# Patient Record
Sex: FEMALE | Race: WHITE | NOT HISPANIC OR LATINO
[De-identification: names, ages, dates, MRNs, and addresses within clinical notes are randomized per-mention and may not be internally consistent; named-entity substitution may affect disease eponyms.]

---

## 2017-05-09 PROBLEM — Z00.00 ENCOUNTER FOR PREVENTIVE HEALTH EXAMINATION: Status: ACTIVE | Noted: 2017-05-09

## 2017-05-15 ENCOUNTER — APPOINTMENT (OUTPATIENT)
Dept: BREAST CENTER | Facility: CLINIC | Age: 52
End: 2017-05-15

## 2017-05-15 VITALS
HEIGHT: 63 IN | WEIGHT: 129 LBS | TEMPERATURE: 97.3 F | DIASTOLIC BLOOD PRESSURE: 58 MMHG | SYSTOLIC BLOOD PRESSURE: 93 MMHG | HEART RATE: 69 BPM | BODY MASS INDEX: 22.86 KG/M2

## 2017-05-24 ENCOUNTER — RESULT REVIEW (OUTPATIENT)
Age: 52
End: 2017-05-24

## 2017-05-25 ENCOUNTER — OUTPATIENT (OUTPATIENT)
Dept: OUTPATIENT SERVICES | Facility: HOSPITAL | Age: 52
LOS: 1 days | End: 2017-05-25
Payer: MEDICAID

## 2017-05-25 DIAGNOSIS — R89.7 ABNORMAL HISTOLOGICAL FINDINGS IN SPECIMENS FROM OTHER ORGANS, SYSTEMS AND TISSUES: ICD-10-CM

## 2017-05-25 PROCEDURE — 88321 CONSLTJ&REPRT SLD PREP ELSWR: CPT

## 2017-05-26 LAB — SURGICAL PATHOLOGY STUDY: SIGNIFICANT CHANGE UP

## 2017-05-31 ENCOUNTER — FORM ENCOUNTER (OUTPATIENT)
Age: 52
End: 2017-05-31

## 2017-06-01 ENCOUNTER — OUTPATIENT (OUTPATIENT)
Dept: OUTPATIENT SERVICES | Facility: HOSPITAL | Age: 52
LOS: 1 days | End: 2017-06-01
Payer: COMMERCIAL

## 2017-06-01 PROCEDURE — 76641 ULTRASOUND BREAST COMPLETE: CPT | Mod: 26,50

## 2017-06-01 PROCEDURE — 76641 ULTRASOUND BREAST COMPLETE: CPT

## 2017-06-05 ENCOUNTER — OUTPATIENT (OUTPATIENT)
Dept: OUTPATIENT SERVICES | Facility: HOSPITAL | Age: 52
LOS: 1 days | End: 2017-06-05
Payer: COMMERCIAL

## 2017-06-05 ENCOUNTER — APPOINTMENT (OUTPATIENT)
Dept: SURGERY | Facility: CLINIC | Age: 52
End: 2017-06-05

## 2017-06-05 VITALS
TEMPERATURE: 99 F | RESPIRATION RATE: 16 BRPM | SYSTOLIC BLOOD PRESSURE: 90 MMHG | HEART RATE: 62 BPM | HEIGHT: 63 IN | WEIGHT: 136.69 LBS | DIASTOLIC BLOOD PRESSURE: 60 MMHG

## 2017-06-05 DIAGNOSIS — Z98.890 OTHER SPECIFIED POSTPROCEDURAL STATES: Chronic | ICD-10-CM

## 2017-06-05 DIAGNOSIS — N63 UNSPECIFIED LUMP IN BREAST: ICD-10-CM

## 2017-06-05 DIAGNOSIS — Z01.818 ENCOUNTER FOR OTHER PREPROCEDURAL EXAMINATION: ICD-10-CM

## 2017-06-05 DIAGNOSIS — J30.2 OTHER SEASONAL ALLERGIC RHINITIS: ICD-10-CM

## 2017-06-05 DIAGNOSIS — F41.9 ANXIETY DISORDER, UNSPECIFIED: ICD-10-CM

## 2017-06-05 DIAGNOSIS — F32.89 OTHER SPECIFIED DEPRESSIVE EPISODES: ICD-10-CM

## 2017-06-05 DIAGNOSIS — F32.9 MAJOR DEPRESSIVE DISORDER, SINGLE EPISODE, UNSPECIFIED: ICD-10-CM

## 2017-06-05 DIAGNOSIS — F41.1 GENERALIZED ANXIETY DISORDER: ICD-10-CM

## 2017-06-05 LAB
ANION GAP SERPL CALC-SCNC: 9 MMOL/L — SIGNIFICANT CHANGE UP (ref 5–17)
BUN SERPL-MCNC: 13 MG/DL — SIGNIFICANT CHANGE UP (ref 7–23)
CALCIUM SERPL-MCNC: 9.1 MG/DL — SIGNIFICANT CHANGE UP (ref 8.4–10.5)
CHLORIDE SERPL-SCNC: 104 MMOL/L — SIGNIFICANT CHANGE UP (ref 96–108)
CO2 SERPL-SCNC: 24 MMOL/L — SIGNIFICANT CHANGE UP (ref 22–31)
CREAT SERPL-MCNC: 0.7 MG/DL — SIGNIFICANT CHANGE UP (ref 0.5–1.3)
GLUCOSE SERPL-MCNC: 72 MG/DL — SIGNIFICANT CHANGE UP (ref 70–99)
HCT VFR BLD CALC: 35.1 % — SIGNIFICANT CHANGE UP (ref 34.5–45)
HGB BLD-MCNC: 11.5 G/DL — SIGNIFICANT CHANGE UP (ref 11.5–15.5)
MCHC RBC-ENTMCNC: 30.2 PG — SIGNIFICANT CHANGE UP (ref 27–34)
MCHC RBC-ENTMCNC: 32.8 G/DL — SIGNIFICANT CHANGE UP (ref 32–36)
MCV RBC AUTO: 92.1 FL — SIGNIFICANT CHANGE UP (ref 80–100)
PLATELET # BLD AUTO: 240 K/UL — SIGNIFICANT CHANGE UP (ref 150–400)
POTASSIUM SERPL-MCNC: 4.4 MMOL/L — SIGNIFICANT CHANGE UP (ref 3.5–5.3)
POTASSIUM SERPL-SCNC: 4.4 MMOL/L — SIGNIFICANT CHANGE UP (ref 3.5–5.3)
RBC # BLD: 3.81 M/UL — SIGNIFICANT CHANGE UP (ref 3.8–5.2)
RBC # FLD: 12.8 % — SIGNIFICANT CHANGE UP (ref 10.3–16.9)
SODIUM SERPL-SCNC: 137 MMOL/L — SIGNIFICANT CHANGE UP (ref 135–145)
WBC # BLD: 5.7 K/UL — SIGNIFICANT CHANGE UP (ref 3.8–10.5)
WBC # FLD AUTO: 5.7 K/UL — SIGNIFICANT CHANGE UP (ref 3.8–10.5)

## 2017-06-05 PROCEDURE — 93010 ELECTROCARDIOGRAM REPORT: CPT

## 2017-06-05 RX ORDER — ESCITALOPRAM OXALATE 10 MG/1
1 TABLET, FILM COATED ORAL
Qty: 0 | Refills: 0 | COMMUNITY

## 2017-06-05 RX ORDER — CLONAZEPAM 1 MG
0 TABLET ORAL
Qty: 0 | Refills: 0 | COMMUNITY

## 2017-06-05 NOTE — H&P PST ADULT - HISTORY OF PRESENT ILLNESS
52 year old female with right breast papilloma (N63) 52 year old white female with right breast papilloma (N63) had mammogram sonogram April 2017 and had biopsy.  This past week repeat sonogram was dense and scheduled for contrast ultrasound.  Mother had breast cancer post menopausal on HRT.  First pregnancy age 42.

## 2017-06-05 NOTE — H&P PST ADULT - NSANTHOSAYNRD_GEN_A_CORE
No. MONIQUE screening performed.  STOP BANG Legend: 0-2 = LOW Risk; 3-4 = INTERMEDIATE Risk; 5-8 = HIGH Risk

## 2017-06-05 NOTE — ASU PATIENT PROFILE, ADULT - VISION (WITH CORRECTIVE LENSES IF THE PATIENT USUALLY WEARS THEM):
Partially impaired: cannot see medication labels or newsprint, but can see obstacles in path, and the surrounding layout; can count fingers at arm's length/eyeglass for reading

## 2017-06-14 ENCOUNTER — FORM ENCOUNTER (OUTPATIENT)
Age: 52
End: 2017-06-14

## 2017-06-15 ENCOUNTER — OUTPATIENT (OUTPATIENT)
Dept: OUTPATIENT SERVICES | Facility: HOSPITAL | Age: 52
LOS: 1 days | End: 2017-06-15
Payer: COMMERCIAL

## 2017-06-15 DIAGNOSIS — Z98.890 OTHER SPECIFIED POSTPROCEDURAL STATES: Chronic | ICD-10-CM

## 2017-06-15 PROCEDURE — G0204: CPT | Mod: 26

## 2017-06-15 PROCEDURE — 77066 DX MAMMO INCL CAD BI: CPT

## 2017-06-19 ENCOUNTER — MESSAGE (OUTPATIENT)
Age: 52
End: 2017-06-19

## 2017-06-19 ENCOUNTER — FORM ENCOUNTER (OUTPATIENT)
Age: 52
End: 2017-06-19

## 2017-06-20 ENCOUNTER — OUTPATIENT (OUTPATIENT)
Dept: OUTPATIENT SERVICES | Facility: HOSPITAL | Age: 52
LOS: 1 days | End: 2017-06-20
Payer: COMMERCIAL

## 2017-06-20 DIAGNOSIS — Z98.890 OTHER SPECIFIED POSTPROCEDURAL STATES: Chronic | ICD-10-CM

## 2017-06-20 PROCEDURE — 77059 MRI BREAST BILATERAL: CPT | Mod: 26

## 2017-06-20 PROCEDURE — C8937: CPT

## 2017-06-20 PROCEDURE — 77049 MRI BREAST C-+ W/CAD BI: CPT

## 2017-06-20 PROCEDURE — A9585: CPT

## 2017-06-20 PROCEDURE — 0159T: CPT | Mod: 26

## 2017-06-27 ENCOUNTER — FORM ENCOUNTER (OUTPATIENT)
Age: 52
End: 2017-06-27

## 2017-06-28 ENCOUNTER — APPOINTMENT (OUTPATIENT)
Dept: BREAST CENTER | Facility: CLINIC | Age: 52
End: 2017-06-28

## 2017-06-28 ENCOUNTER — RESULT REVIEW (OUTPATIENT)
Age: 52
End: 2017-06-28

## 2017-06-28 ENCOUNTER — OUTPATIENT (OUTPATIENT)
Dept: OUTPATIENT SERVICES | Facility: HOSPITAL | Age: 52
LOS: 1 days | End: 2017-06-28
Payer: COMMERCIAL

## 2017-06-28 DIAGNOSIS — Z98.890 OTHER SPECIFIED POSTPROCEDURAL STATES: Chronic | ICD-10-CM

## 2017-06-28 PROCEDURE — A4648: CPT

## 2017-06-28 PROCEDURE — 88305 TISSUE EXAM BY PATHOLOGIST: CPT

## 2017-06-28 PROCEDURE — 77065 DX MAMMO INCL CAD UNI: CPT

## 2017-06-28 PROCEDURE — 19085 BX BREAST 1ST LESION MR IMAG: CPT | Mod: RT

## 2017-06-28 PROCEDURE — G0206: CPT | Mod: 26,RT

## 2017-06-28 PROCEDURE — 19086 BX BREAST ADD LESION MR IMAG: CPT

## 2017-06-28 PROCEDURE — 19086 BX BREAST ADD LESION MR IMAG: CPT | Mod: RT

## 2017-06-28 PROCEDURE — 19085 BX BREAST 1ST LESION MR IMAG: CPT

## 2017-06-28 PROCEDURE — A9585: CPT

## 2017-06-28 PROCEDURE — 88342 IMHCHEM/IMCYTCHM 1ST ANTB: CPT

## 2017-06-28 PROCEDURE — 88341 IMHCHEM/IMCYTCHM EA ADD ANTB: CPT

## 2017-06-30 LAB — SURGICAL PATHOLOGY STUDY: SIGNIFICANT CHANGE UP

## 2017-07-28 ENCOUNTER — OUTPATIENT (OUTPATIENT)
Dept: OUTPATIENT SERVICES | Facility: HOSPITAL | Age: 52
LOS: 1 days | End: 2017-07-28

## 2017-07-28 ENCOUNTER — APPOINTMENT (OUTPATIENT)
Dept: SURGERY | Facility: CLINIC | Age: 52
End: 2017-07-28

## 2017-07-28 VITALS
HEIGHT: 63 IN | TEMPERATURE: 97 F | DIASTOLIC BLOOD PRESSURE: 55 MMHG | RESPIRATION RATE: 16 BRPM | SYSTOLIC BLOOD PRESSURE: 90 MMHG | HEART RATE: 57 BPM | WEIGHT: 132.28 LBS | OXYGEN SATURATION: 98 %

## 2017-07-28 DIAGNOSIS — N63 UNSPECIFIED LUMP IN BREAST: ICD-10-CM

## 2017-07-28 DIAGNOSIS — Z98.890 OTHER SPECIFIED POSTPROCEDURAL STATES: Chronic | ICD-10-CM

## 2017-07-28 DIAGNOSIS — Z01.818 ENCOUNTER FOR OTHER PREPROCEDURAL EXAMINATION: ICD-10-CM

## 2017-07-28 LAB
ANION GAP SERPL CALC-SCNC: 13 MMOL/L — SIGNIFICANT CHANGE UP (ref 5–17)
BUN SERPL-MCNC: 9 MG/DL — SIGNIFICANT CHANGE UP (ref 7–23)
CALCIUM SERPL-MCNC: 9.1 MG/DL — SIGNIFICANT CHANGE UP (ref 8.4–10.5)
CHLORIDE SERPL-SCNC: 100 MMOL/L — SIGNIFICANT CHANGE UP (ref 96–108)
CO2 SERPL-SCNC: 23 MMOL/L — SIGNIFICANT CHANGE UP (ref 22–31)
CREAT SERPL-MCNC: 0.7 MG/DL — SIGNIFICANT CHANGE UP (ref 0.5–1.3)
GLUCOSE SERPL-MCNC: 78 MG/DL — SIGNIFICANT CHANGE UP (ref 70–99)
HCG SERPL-ACNC: <.1 MIU/ML — SIGNIFICANT CHANGE UP
HCT VFR BLD CALC: 35.5 % — SIGNIFICANT CHANGE UP (ref 34.5–45)
HGB BLD-MCNC: 11.4 G/DL — LOW (ref 11.5–15.5)
MCHC RBC-ENTMCNC: 30.6 PG — SIGNIFICANT CHANGE UP (ref 27–34)
MCHC RBC-ENTMCNC: 32.1 G/DL — SIGNIFICANT CHANGE UP (ref 32–36)
MCV RBC AUTO: 95.2 FL — SIGNIFICANT CHANGE UP (ref 80–100)
PLATELET # BLD AUTO: 296 K/UL — SIGNIFICANT CHANGE UP (ref 150–400)
POTASSIUM SERPL-MCNC: 4.6 MMOL/L — SIGNIFICANT CHANGE UP (ref 3.5–5.3)
POTASSIUM SERPL-SCNC: 4.6 MMOL/L — SIGNIFICANT CHANGE UP (ref 3.5–5.3)
RBC # BLD: 3.73 M/UL — LOW (ref 3.8–5.2)
RBC # FLD: 13.2 % — SIGNIFICANT CHANGE UP (ref 10.3–16.9)
SODIUM SERPL-SCNC: 136 MMOL/L — SIGNIFICANT CHANGE UP (ref 135–145)
WBC # BLD: 6.2 K/UL — SIGNIFICANT CHANGE UP (ref 3.8–10.5)
WBC # FLD AUTO: 6.2 K/UL — SIGNIFICANT CHANGE UP (ref 3.8–10.5)

## 2017-07-28 NOTE — ASU PATIENT PROFILE, ADULT - VISION (WITH CORRECTIVE LENSES IF THE PATIENT USUALLY WEARS THEM):
Partially impaired: cannot see medication labels or newsprint, but can see obstacles in path, and the surrounding layout; can count fingers at arm's length/eyeglass for reading/distance

## 2017-07-28 NOTE — H&P PST ADULT - VISION (WITH CORRECTIVE LENSES IF THE PATIENT USUALLY WEARS THEM):
eyeglass for reading/Partially impaired: cannot see medication labels or newsprint, but can see obstacles in path, and the surrounding layout; can count fingers at arm's length

## 2017-07-28 NOTE — H&P PST ADULT - HISTORY OF PRESENT ILLNESS
53 y/o female had extensive evaluation of right breast mass since 4/15/29944. 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 She will require a wide excision of this mass.

## 2017-08-13 ENCOUNTER — TRANSCRIPTION ENCOUNTER (OUTPATIENT)
Age: 52
End: 2017-08-13

## 2017-08-13 ENCOUNTER — FORM ENCOUNTER (OUTPATIENT)
Age: 52
End: 2017-08-13

## 2017-08-14 ENCOUNTER — RESULT REVIEW (OUTPATIENT)
Age: 52
End: 2017-08-14

## 2017-08-14 ENCOUNTER — APPOINTMENT (OUTPATIENT)
Dept: BREAST CENTER | Facility: HOSPITAL | Age: 52
End: 2017-08-14

## 2017-08-14 ENCOUNTER — OUTPATIENT (OUTPATIENT)
Dept: OUTPATIENT SERVICES | Facility: HOSPITAL | Age: 52
LOS: 1 days | Discharge: HOME | End: 2017-08-14
Payer: COMMERCIAL

## 2017-08-14 VITALS
SYSTOLIC BLOOD PRESSURE: 81 MMHG | HEIGHT: 63 IN | RESPIRATION RATE: 16 BRPM | TEMPERATURE: 98 F | DIASTOLIC BLOOD PRESSURE: 52 MMHG | OXYGEN SATURATION: 100 % | HEART RATE: 57 BPM | WEIGHT: 130.73 LBS

## 2017-08-14 VITALS
DIASTOLIC BLOOD PRESSURE: 61 MMHG | RESPIRATION RATE: 16 BRPM | HEART RATE: 56 BPM | SYSTOLIC BLOOD PRESSURE: 92 MMHG | OXYGEN SATURATION: 100 %

## 2017-08-14 DIAGNOSIS — Z98.890 OTHER SPECIFIED POSTPROCEDURAL STATES: Chronic | ICD-10-CM

## 2017-08-14 PROCEDURE — 19281 PERQ DEVICE BREAST 1ST IMAG: CPT

## 2017-08-14 PROCEDURE — 71045 X-RAY EXAM CHEST 1 VIEW: CPT

## 2017-08-14 PROCEDURE — 76098 X-RAY EXAM SURGICAL SPECIMEN: CPT | Mod: 26

## 2017-08-14 PROCEDURE — 19125 EXCISION BREAST LESION: CPT | Mod: RT

## 2017-08-14 PROCEDURE — 71010: CPT | Mod: 26

## 2017-08-14 PROCEDURE — 19125 EXCISION BREAST LESION: CPT | Mod: RT,GC

## 2017-08-14 PROCEDURE — C1819: CPT

## 2017-08-14 PROCEDURE — 76098 X-RAY EXAM SURGICAL SPECIMEN: CPT

## 2017-08-14 PROCEDURE — 88307 TISSUE EXAM BY PATHOLOGIST: CPT

## 2017-08-14 PROCEDURE — 19285 PERQ DEV BREAST 1ST US IMAG: CPT | Mod: RT

## 2017-08-14 PROCEDURE — 19281 PERQ DEVICE BREAST 1ST IMAG: CPT | Mod: 59,RT

## 2017-08-14 PROCEDURE — 19285 PERQ DEV BREAST 1ST US IMAG: CPT

## 2017-08-14 RX ORDER — ACETAMINOPHEN WITH CODEINE 300MG-30MG
1 TABLET ORAL
Qty: 10 | Refills: 0 | OUTPATIENT
Start: 2017-08-14

## 2017-08-14 RX ORDER — ONDANSETRON 8 MG/1
4 TABLET, FILM COATED ORAL ONCE
Qty: 0 | Refills: 0 | Status: DISCONTINUED | OUTPATIENT
Start: 2017-08-14 | End: 2017-08-14

## 2017-08-14 RX ORDER — CODEINE SULFATE 60 MG/1
15 TABLET ORAL EVERY 4 HOURS
Qty: 0 | Refills: 0 | Status: DISCONTINUED | OUTPATIENT
Start: 2017-08-14 | End: 2017-08-14

## 2017-08-14 RX ORDER — DOCUSATE SODIUM 100 MG
1 CAPSULE ORAL
Qty: 20 | Refills: 0 | OUTPATIENT
Start: 2017-08-14 | End: 2017-08-24

## 2017-08-15 LAB — SURGICAL PATHOLOGY STUDY: SIGNIFICANT CHANGE UP

## 2017-08-16 DIAGNOSIS — N60.21 FIBROADENOSIS OF RIGHT BREAST: ICD-10-CM

## 2017-08-16 DIAGNOSIS — D24.1 BENIGN NEOPLASM OF RIGHT BREAST: ICD-10-CM

## 2017-08-16 DIAGNOSIS — F41.9 ANXIETY DISORDER, UNSPECIFIED: ICD-10-CM

## 2017-08-16 DIAGNOSIS — N60.91 UNSPECIFIED BENIGN MAMMARY DYSPLASIA OF RIGHT BREAST: ICD-10-CM

## 2017-08-16 DIAGNOSIS — N60.41 MAMMARY DUCT ECTASIA OF RIGHT BREAST: ICD-10-CM

## 2017-08-23 ENCOUNTER — APPOINTMENT (OUTPATIENT)
Dept: BREAST CENTER | Facility: CLINIC | Age: 52
End: 2017-08-23
Payer: COMMERCIAL

## 2017-08-23 VITALS
BODY MASS INDEX: 22.86 KG/M2 | DIASTOLIC BLOOD PRESSURE: 49 MMHG | TEMPERATURE: 97 F | WEIGHT: 129 LBS | SYSTOLIC BLOOD PRESSURE: 79 MMHG | HEIGHT: 63 IN

## 2017-08-23 DIAGNOSIS — R92.8 OTHER ABNORMAL AND INCONCLUSIVE FINDINGS ON DIAGNOSTIC IMAGING OF BREAST: ICD-10-CM

## 2017-08-23 DIAGNOSIS — R89.7 ABNORMAL HISTOLOGICAL FINDINGS IN SPECIMENS FROM OTHER ORGANS, SYSTEMS AND TISSUES: ICD-10-CM

## 2017-08-23 PROCEDURE — 99024 POSTOP FOLLOW-UP VISIT: CPT

## 2017-08-23 RX ORDER — CLONAZEPAM 1 MG/1
1 TABLET ORAL
Refills: 0 | Status: ACTIVE | COMMUNITY

## 2017-08-23 RX ORDER — ESCITALOPRAM OXALATE 20 MG/1
20 TABLET, FILM COATED ORAL
Refills: 0 | Status: ACTIVE | COMMUNITY

## 2019-06-07 NOTE — ASU PREOP CHECKLIST - BP NONINVASIVE DIASTOLIC (MM HG)
Referred by Dr. Ramirez for cellulitis of the neck. - comments are : hx of mrsa in past in same location - failed clindamycin and doxy     Ok to schedule any provider, next available.    54

## 2021-02-03 NOTE — PACU DISCHARGE NOTE - COMMENTS
Covid test negative will be at appointment today with Dr. Cecy Wren at 1:15pm in [place] [address]. Vtial signs stable. denies pain nausea or vomiting. tolerate water and crackers. pt and spouse educated on medications, safety and follow up. discharged home stable. pt left unit accompanied by spouse.
